# Patient Record
Sex: FEMALE | Race: WHITE
[De-identification: names, ages, dates, MRNs, and addresses within clinical notes are randomized per-mention and may not be internally consistent; named-entity substitution may affect disease eponyms.]

---

## 2020-04-16 ENCOUNTER — HOSPITAL ENCOUNTER (EMERGENCY)
Dept: HOSPITAL 76 - ED | Age: 70
Discharge: HOME | End: 2020-04-16
Payer: MEDICARE

## 2020-04-16 VITALS — SYSTOLIC BLOOD PRESSURE: 108 MMHG | DIASTOLIC BLOOD PRESSURE: 88 MMHG

## 2020-04-16 DIAGNOSIS — E78.00: ICD-10-CM

## 2020-04-16 DIAGNOSIS — R07.89: Primary | ICD-10-CM

## 2020-04-16 LAB
ALBUMIN DIAFP-MCNC: 4.6 G/DL (ref 3.2–5.5)
ALBUMIN/GLOB SERPL: 1.8 {RATIO} (ref 1–2.2)
ALP SERPL-CCNC: 55 IU/L (ref 42–121)
ALT SERPL W P-5'-P-CCNC: 25 IU/L (ref 10–60)
ANION GAP SERPL CALCULATED.4IONS-SCNC: 5 MMOL/L (ref 6–13)
AST SERPL W P-5'-P-CCNC: 26 IU/L (ref 10–42)
BASOPHILS NFR BLD AUTO: 0 10^3/UL (ref 0–0.1)
BASOPHILS NFR BLD AUTO: 0.4 %
BILIRUB BLD-MCNC: 0.8 MG/DL (ref 0.2–1)
BUN SERPL-MCNC: 21 MG/DL (ref 6–20)
CALCIUM UR-MCNC: 9 MG/DL (ref 8.5–10.3)
CHLORIDE SERPL-SCNC: 108 MMOL/L (ref 101–111)
CO2 SERPL-SCNC: 26 MMOL/L (ref 21–32)
CREAT SERPLBLD-SCNC: 0.9 MG/DL (ref 0.4–1)
EOSINOPHIL # BLD AUTO: 0.2 10^3/UL (ref 0–0.7)
EOSINOPHIL NFR BLD AUTO: 2.6 %
ERYTHROCYTE [DISTWIDTH] IN BLOOD BY AUTOMATED COUNT: 12.1 % (ref 12–15)
GLOBULIN SER-MCNC: 2.5 G/DL (ref 2.1–4.2)
GLUCOSE SERPL-MCNC: 97 MG/DL (ref 70–100)
HGB UR QL STRIP: 13.5 G/DL (ref 12–16)
LIPASE SERPL-CCNC: 42 U/L (ref 22–51)
LYMPHOCYTES # SPEC AUTO: 1.6 10^3/UL (ref 1.5–3.5)
LYMPHOCYTES NFR BLD AUTO: 22.8 %
MCH RBC QN AUTO: 31.3 PG (ref 27–31)
MCHC RBC AUTO-ENTMCNC: 33.5 G/DL (ref 32–36)
MCV RBC AUTO: 93.5 FL (ref 81–99)
MONOCYTES # BLD AUTO: 0.5 10^3/UL (ref 0–1)
MONOCYTES NFR BLD AUTO: 7.6 %
NEUTROPHILS # BLD AUTO: 4.5 10^3/UL (ref 1.5–6.6)
NEUTROPHILS # SNV AUTO: 6.8 X10^3/UL (ref 4.8–10.8)
NEUTROPHILS NFR BLD AUTO: 66.3 %
PDW BLD AUTO: 9.1 FL (ref 7.9–10.8)
PLATELET # BLD: 108 10^3/UL (ref 130–450)
PROT SPEC-MCNC: 7.1 G/DL (ref 6.7–8.2)
RBC MAR: 4.31 10^6/UL (ref 4.2–5.4)
SODIUM SERPLBLD-SCNC: 139 MMOL/L (ref 135–145)

## 2020-04-16 PROCEDURE — 85025 COMPLETE CBC W/AUTO DIFF WBC: CPT

## 2020-04-16 PROCEDURE — 83690 ASSAY OF LIPASE: CPT

## 2020-04-16 PROCEDURE — 71045 X-RAY EXAM CHEST 1 VIEW: CPT

## 2020-04-16 PROCEDURE — 36415 COLL VENOUS BLD VENIPUNCTURE: CPT

## 2020-04-16 PROCEDURE — 99284 EMERGENCY DEPT VISIT MOD MDM: CPT

## 2020-04-16 PROCEDURE — 84484 ASSAY OF TROPONIN QUANT: CPT

## 2020-04-16 PROCEDURE — 93005 ELECTROCARDIOGRAM TRACING: CPT

## 2020-04-16 PROCEDURE — 85379 FIBRIN DEGRADATION QUANT: CPT

## 2020-04-16 PROCEDURE — 80053 COMPREHEN METABOLIC PANEL: CPT

## 2020-04-16 RX ADMIN — NITROGLYCERIN STA MG: 0.4 TABLET, ORALLY DISINTEGRATING SUBLINGUAL at 14:43

## 2020-04-16 RX ADMIN — NITROGLYCERIN STA MG: 0.4 TABLET, ORALLY DISINTEGRATING SUBLINGUAL at 14:19

## 2020-04-16 RX ADMIN — NITROGLYCERIN STA MG: 0.4 TABLET, ORALLY DISINTEGRATING SUBLINGUAL at 14:35

## 2020-04-16 NOTE — ED PHYSICIAN DOCUMENTATION
PD HPI CHEST PAIN





- Stated complaint


Stated Complaint: CHEST PX





- Chief complaint


Chief Complaint: Cardiac





- History obtained from


History obtained from: Patient





- History of Present Illness


Timing - details: Intermittant


Pain level max: 3


Pain level now: 2


Quality: Pressure


Location: Left chest


Radiation: Left upper extremity


Improved by: Nothing


Worsened by: Other (nothing)


Associated symptoms: Shortness of air, Feeling faint / dizzy.  No: Diaphoresis, 

Nausea, Vomiting, General Weakness, Palpitations, Cough


Recently seen: Not recently seen





- Additional information


Additional information: 





70-year-old female presents to the emergency department stating that she has 

left-sided chest pain and left arm pain for the past 1 to 2 weeks.  She states 

that it was only lasting 1 to 2 minutes at a time last week, now has become more

constant.  Lasted for 2 hours this morning and is currently lasted for 2 hours 

with the current episode.  She describes it as squeezing of the left chest and 

left arm.  Does not have any cardiac history, but states that she did have a 

stress test last year that was "stopped early".  She does not know why this was 

stopped earlier if there was any ischemic changes.  She took aspirin prior to 

arrival.  She states that she felt short of breath this morning 2.  She also 

states that her legs have been more swollen than usual.  No recent surgery.  No 

recent travel.





Review of Systems


Ten Systems: 10 systems reviewed and negative


Constitutional: denies: Fever, Chills


Ears: denies: Ear pain


Nose: denies: Rhinorrhea / runny nose, Congestion


Throat: denies: Sore throat


Respiratory: denies: Cough, Wheezing


GI: denies: Abdominal Pain, Nausea, Vomiting, Diarrhea


Skin: denies: Rash


Musculoskeletal: denies: Neck pain, Back pain


Neurologic: denies: Headache





PD PAST MEDICAL HISTORY





- Past Medical History


Past Medical History: Yes


Cardiovascular: High cholesterol





- Allergies


Allergies/Adverse Reactions: 


                                    Allergies











Allergy/AdvReac Type Severity Reaction Status Date / Time


 


No Known Drug Allergies Allergy   Verified 04/16/20 12:53














- Living Situation


Living Arrangement: reports: At home





- Social History


Does the pt smoke?: No


Does the pt have substance abuse?: No





- Family History


Family history: reports: Non contributory





PD ED PE NORMAL





- Vitals


Vital signs reviewed: Yes





- General


General: Alert and oriented X 3, No acute distress, Well developed/nourished





- HEENT


HEENT: PERRL, Moist mucous membranes





- Neck


Neck: Supple, no meningeal sign, No JVD, No bruit





- Cardiac


Cardiac: RRR, No murmur, Strong equal pulses





- Respiratory


Respiratory: No respiratory distress, Clear bilaterally





- Abdomen


Abdomen: Soft, Non tender, Non distended





- Derm


Derm: Warm and dry, No rash





- Extremities


Extremities: No calf tenderness / cord, Other (1+ edema bilateral lower 

extremity)





- Neuro


Neuro: Alert and oriented X 3





- Psych


Psych: Normal mood, Normal affect





Results





- Vitals


Vitals: 


                               Vital Signs - 24 hr











  04/16/20 04/16/20 04/16/20





  12:54 14:19 14:31


 


Temperature 36.8 C  


 


Heart Rate 64 61 54 L


 


Respiratory 16 16 16





Rate   


 


Blood Pressure 144/93 H 157/76 H 122/76


 


O2 Saturation 96 99 97














  04/16/20 04/16/20 04/16/20





  14:40 14:55 15:15


 


Temperature   


 


Heart Rate 59 L 63 54 L


 


Respiratory 16 16 16





Rate   


 


Blood Pressure 124/72 124/74 108/52 L


 


O2 Saturation 98 96 














  04/16/20 04/16/20 04/16/20





  15:30 16:15 17:00


 


Temperature   


 


Heart Rate 64 64 62


 


Respiratory 16 16 16





Rate   


 


Blood Pressure 110/63 112/78 108/88 H


 


O2 Saturation 98  95








                                     Oxygen











O2 Source                      Room air

















- EKG (time done)


  ** 1302


Rate: Rate (enter#) (64)


Rhythm: NSR


Axis: Normal


Intervals: Normal WA


QRS: Normal


Ischemia: Normal ST segments





- Labs


Labs: 


                                Laboratory Tests











  04/16/20 04/16/20 04/16/20





  14:01 14:01 14:01


 


WBC  6.8  


 


RBC  4.31  


 


Hgb  13.5  


 


Hct  40.3  


 


MCV  93.5  


 


MCH  31.3 H  


 


MCHC  33.5  


 


RDW  12.1  


 


Plt Count  108 L  


 


MPV  9.1  


 


Neut # (Auto)  4.5  


 


Lymph # (Auto)  1.6  


 


Mono # (Auto)  0.5  


 


Eos # (Auto)  0.2  


 


Baso # (Auto)  0.0  


 


Absolute Nucleated RBC  0.00  


 


Nucleated RBC %  0.0  


 


D-Dimer   


 


Sodium   139 


 


Potassium   3.6 


 


Chloride   108 


 


Carbon Dioxide   26 


 


Anion Gap   5.0 L 


 


BUN   21 H 


 


Creatinine   0.9 


 


Estimated GFR (MDRD)   62 L 


 


Glucose   97 


 


Calcium   9.0 


 


Total Bilirubin   0.8 


 


AST   26 


 


ALT   25 


 


Alkaline Phosphatase   55 


 


Troponin I High Sens    2.5


 


Total Protein   7.1 


 


Albumin   4.6 


 


Globulin   2.5 


 


Albumin/Globulin Ratio   1.8 


 


Lipase   42 














  04/16/20 04/16/20





  14:01 16:40


 


WBC  


 


RBC  


 


Hgb  


 


Hct  


 


MCV  


 


MCH  


 


MCHC  


 


RDW  


 


Plt Count  


 


MPV  


 


Neut # (Auto)  


 


Lymph # (Auto)  


 


Mono # (Auto)  


 


Eos # (Auto)  


 


Baso # (Auto)  


 


Absolute Nucleated RBC  


 


Nucleated RBC %  


 


D-Dimer  248.2 


 


Sodium  


 


Potassium  


 


Chloride  


 


Carbon Dioxide  


 


Anion Gap  


 


BUN  


 


Creatinine  


 


Estimated GFR (MDRD)  


 


Glucose  


 


Calcium  


 


Total Bilirubin  


 


AST  


 


ALT  


 


Alkaline Phosphatase  


 


Troponin I High Sens   < 2.3 L


 


Total Protein  


 


Albumin  


 


Globulin  


 


Albumin/Globulin Ratio  


 


Lipase  














- Rads (name of study)


  ** cxr


Radiology: Prelim report reviewed, EMP read contemporaneously, See rad report 

(no acute disease)





PD MEDICAL DECISION MAKING





- ED course


Complexity details: reviewed results, re-evaluated patient, considered 

differential (No ST elevation MI, no aortic dissection, no PE, no tension 

pneumothorax, no aortic aneurysm), d/w patient, d/w consultant


ED course: 





70-year-old female presents to the emergency department with chest pain for the 

past several weeks.  Negative high-sensitivity troponin x2.  Normal EKG.  

Discussed the case with Filipe Walls, he will schedule an urgent stress 

test for her.  She had a negative stress echo last year in June.  He will also 

send a note to her primary doctor.  She does not want to be admitted to the 

hospital at this time.  She is asymptomatic currently in the emergency 

department.  She will start on a daily aspirin and return if symptoms recur.  

Patient counseled regarding signs and symptoms for which I believe and urgent 

re-evaluation would be necessary. Patient with good understanding of and 

agreement to plan and is comfortable going home at this time





This document was made in part using voice recognition software. While efforts 

are made to proofread this document, sound alike and grammatical errors may 

occur.





Departure





- Departure


Disposition: 01 Home, Self Care


Clinical Impression: 


Chest pain


Qualifiers:


 Chest pain type: unspecified Qualified Code(s): R07.9 - Chest pain, unspecified





Condition: Good


Instructions:  ED Chest Pain Atypical Unkn Cause


Follow-Up: 


Leah Hurtado MD [Primary Care Provider] - Within 1 week


Comments: 


I spoke with Dr. Carlos Enrique dooley and an urgent cardiac stress test has been 

ordered for you. [facility] will call you for this. Start on 81mg of aspirin daily. 

Return if you worsen. Avoid strenuous activity. 


Discharge Date/Time: 04/16/20 17:32

## 2020-04-16 NOTE — XRAY REPORT
Reason:  Chest pain

Procedure Date:  04/16/2020   

Accession Number:  [id number] / [id number]                    

Procedure:  XR  - Chest 1 View X-Ray CPT Code:  63238

 

***Final Report***

 

 

FULL RESULT:

 

 

EXAM:

CHEST RADIOGRAPHY

 

EXAM DATE: 4/16/2020 12:57 PM.

 

CLINICAL HISTORY: Chest pain.

 

COMPARISON: XR CHEST PA AND LAT 07/13/2011 8:21 PM.

 

TECHNIQUE: 1 view.

 

FINDINGS:

Lungs/Pleura: No focal opacities evident. No pleural effusion. No 

pneumothorax.

 

Mediastinum: Within exam limitations, the cardiomediastinal contour is 

normal.

 

Other: None.

IMPRESSION: No acute intrathoracic plain film abnormality.

 

[facility]